# Patient Record
Sex: FEMALE | Race: WHITE | NOT HISPANIC OR LATINO | URBAN - METROPOLITAN AREA
[De-identification: names, ages, dates, MRNs, and addresses within clinical notes are randomized per-mention and may not be internally consistent; named-entity substitution may affect disease eponyms.]

---

## 2023-12-26 ENCOUNTER — HOSPITAL ENCOUNTER (OUTPATIENT)
Dept: CARDIOLOGY | Facility: CLINIC | Age: 73
Discharge: HOME | End: 2023-12-26
Payer: MEDICARE

## 2023-12-26 DIAGNOSIS — I47.29 PAROXYSMAL VENTRICULAR TACHYCARDIA (MULTI): ICD-10-CM

## 2024-05-03 ENCOUNTER — HOSPITAL ENCOUNTER (OUTPATIENT)
Dept: CARDIOLOGY | Facility: CLINIC | Age: 74
Discharge: HOME | End: 2024-05-03
Payer: MEDICARE

## 2024-05-03 DIAGNOSIS — I47.29 PAROXYSMAL VENTRICULAR TACHYCARDIA (MULTI): ICD-10-CM

## 2024-05-03 PROCEDURE — 93295 DEV INTERROG REMOTE 1/2/MLT: CPT | Performed by: INTERNAL MEDICINE

## 2024-05-03 PROCEDURE — 93296 REM INTERROG EVL PM/IDS: CPT

## 2024-09-23 ENCOUNTER — HOSPITAL ENCOUNTER (OUTPATIENT)
Dept: CARDIOLOGY | Facility: CLINIC | Age: 74
Discharge: HOME | End: 2024-09-23
Payer: MEDICARE

## 2024-09-23 DIAGNOSIS — I47.29 PAROXYSMAL VENTRICULAR TACHYCARDIA (MULTI): ICD-10-CM

## 2024-09-23 PROCEDURE — 93296 REM INTERROG EVL PM/IDS: CPT

## 2024-09-23 PROCEDURE — 93295 DEV INTERROG REMOTE 1/2/MLT: CPT | Performed by: INTERNAL MEDICINE

## 2024-11-01 ENCOUNTER — HOSPITAL ENCOUNTER (OUTPATIENT)
Dept: CARDIOLOGY | Facility: CLINIC | Age: 74
Discharge: HOME | End: 2024-11-01
Payer: MEDICARE

## 2024-11-01 DIAGNOSIS — I47.29 PAROXYSMAL VENTRICULAR TACHYCARDIA (MULTI): Primary | ICD-10-CM

## 2024-11-01 DIAGNOSIS — I47.29 PAROXYSMAL VENTRICULAR TACHYCARDIA (MULTI): ICD-10-CM

## 2024-11-01 PROCEDURE — 93296 REM INTERROG EVL PM/IDS: CPT

## 2024-12-02 ENCOUNTER — APPOINTMENT (OUTPATIENT)
Dept: CARDIOLOGY | Facility: HOSPITAL | Age: 74
End: 2024-12-02
Payer: MEDICARE

## 2024-12-30 ENCOUNTER — HOSPITAL ENCOUNTER (OUTPATIENT)
Dept: CARDIOLOGY | Facility: CLINIC | Age: 74
Discharge: HOME | End: 2024-12-30
Payer: MEDICARE

## 2024-12-30 DIAGNOSIS — Z95.810 PRESENCE OF AUTOMATIC (IMPLANTABLE) CARDIAC DEFIBRILLATOR: ICD-10-CM

## 2024-12-30 DIAGNOSIS — I47.29 OTHER VENTRICULAR TACHYCARDIA: ICD-10-CM

## 2024-12-30 PROCEDURE — 93296 REM INTERROG EVL PM/IDS: CPT

## 2025-01-20 DIAGNOSIS — I42.9 CARDIOMYOPATHY, UNSPECIFIED TYPE (MULTI): ICD-10-CM

## 2025-01-20 DIAGNOSIS — Z95.810 CARDIAC DEFIBRILLATOR IN PLACE: Primary | ICD-10-CM

## 2025-01-22 ENCOUNTER — HOSPITAL ENCOUNTER (OUTPATIENT)
Dept: CARDIOLOGY | Facility: HOSPITAL | Age: 75
Discharge: HOME | End: 2025-01-22
Payer: MEDICARE

## 2025-01-22 DIAGNOSIS — I47.29 PAROXYSMAL VENTRICULAR TACHYCARDIA (MULTI): ICD-10-CM

## 2025-01-22 PROCEDURE — 93282 PRGRMG EVAL IMPLANTABLE DFB: CPT

## 2025-04-16 ENCOUNTER — HOSPITAL ENCOUNTER (OUTPATIENT)
Dept: CARDIOLOGY | Facility: CLINIC | Age: 75
Discharge: HOME | End: 2025-04-16
Payer: MEDICARE

## 2025-04-16 DIAGNOSIS — I47.29 OTHER VENTRICULAR TACHYCARDIA: ICD-10-CM

## 2025-04-16 DIAGNOSIS — Z95.810 PRESENCE OF AUTOMATIC (IMPLANTABLE) CARDIAC DEFIBRILLATOR: ICD-10-CM

## 2025-04-16 PROCEDURE — 93296 REM INTERROG EVL PM/IDS: CPT

## 2025-05-27 ENCOUNTER — HOSPITAL ENCOUNTER (OUTPATIENT)
Dept: CARDIOLOGY | Facility: CLINIC | Age: 75
Discharge: HOME | End: 2025-05-27
Payer: MEDICARE

## 2025-05-27 DIAGNOSIS — Z95.810 PRESENCE OF AUTOMATIC (IMPLANTABLE) CARDIAC DEFIBRILLATOR: Primary | ICD-10-CM

## 2025-05-27 DIAGNOSIS — I47.29 OTHER VENTRICULAR TACHYCARDIA: ICD-10-CM

## 2025-05-27 DIAGNOSIS — Z95.810 PRESENCE OF AUTOMATIC (IMPLANTABLE) CARDIAC DEFIBRILLATOR: ICD-10-CM

## 2025-05-28 ENCOUNTER — HOSPITAL ENCOUNTER (OUTPATIENT)
Dept: CARDIOLOGY | Facility: HOSPITAL | Age: 75
Discharge: HOME | End: 2025-05-28
Payer: MEDICARE

## 2025-05-28 DIAGNOSIS — I47.20 VENTRICULAR TACHYCARDIA (PAROXYSMAL): ICD-10-CM

## 2025-05-28 DIAGNOSIS — I47.29 OTHER VENTRICULAR TACHYCARDIA: ICD-10-CM

## 2025-05-28 DIAGNOSIS — Z95.810 PRESENCE OF AUTOMATIC (IMPLANTABLE) CARDIAC DEFIBRILLATOR: ICD-10-CM

## 2025-05-28 DIAGNOSIS — Z95.810 CARDIAC DEFIBRILLATOR IN PLACE: Primary | ICD-10-CM

## 2025-06-20 RX ORDER — AMIODARONE HYDROCHLORIDE 200 MG/1
200 TABLET ORAL
COMMUNITY
Start: 2025-04-04

## 2025-06-20 RX ORDER — BIMATOPROST 0.1 MG/ML
SOLUTION/ DROPS OPHTHALMIC
COMMUNITY
Start: 2021-04-19 | End: 2025-06-25 | Stop reason: WASHOUT

## 2025-06-20 RX ORDER — ONDANSETRON 4 MG/1
4 TABLET, FILM COATED ORAL
COMMUNITY
Start: 2025-04-16 | End: 2025-06-25 | Stop reason: WASHOUT

## 2025-06-20 RX ORDER — METOPROLOL SUCCINATE 50 MG/1
25 TABLET, EXTENDED RELEASE ORAL
COMMUNITY
Start: 2025-04-04

## 2025-06-20 RX ORDER — EZETIMIBE 10 MG/1
10 TABLET ORAL
COMMUNITY
Start: 2025-04-04

## 2025-06-20 RX ORDER — MELOXICAM 15 MG/1
TABLET ORAL
COMMUNITY
Start: 2025-04-16 | End: 2025-06-25 | Stop reason: WASHOUT

## 2025-06-20 RX ORDER — METOPROLOL SUCCINATE 100 MG/1
1 TABLET, EXTENDED RELEASE ORAL DAILY
COMMUNITY
Start: 2014-10-27

## 2025-06-20 RX ORDER — LEVOTHYROXINE SODIUM 100 UG/1
100 TABLET ORAL
COMMUNITY
Start: 2025-04-04

## 2025-06-20 RX ORDER — MULTIVITAMIN WITH IRON
1 TABLET ORAL DAILY
COMMUNITY
Start: 2021-04-19 | End: 2025-06-25 | Stop reason: WASHOUT

## 2025-06-20 RX ORDER — PANTOPRAZOLE SODIUM 20 MG/1
1 TABLET, DELAYED RELEASE ORAL
COMMUNITY
Start: 2025-04-16

## 2025-06-20 RX ORDER — SACUBITRIL AND VALSARTAN 97; 103 MG/1; MG/1
1 TABLET, FILM COATED ORAL 2 TIMES DAILY
COMMUNITY
Start: 2021-05-10 | End: 2025-06-23 | Stop reason: SDUPTHER

## 2025-06-20 RX ORDER — LISINOPRIL 20 MG/1
20 TABLET ORAL
COMMUNITY
Start: 2025-04-04 | End: 2025-06-23 | Stop reason: ALTCHOICE

## 2025-06-20 RX ORDER — FUROSEMIDE 20 MG/1
20 TABLET ORAL DAILY
COMMUNITY

## 2025-06-20 RX ORDER — SPIRONOLACTONE 25 MG/1
25 TABLET ORAL DAILY
COMMUNITY
Start: 2015-06-15

## 2025-06-20 RX ORDER — NAPROXEN SODIUM 220 MG/1
TABLET, FILM COATED ORAL
COMMUNITY
Start: 2021-04-19

## 2025-06-20 RX ORDER — ERGOCALCIFEROL 1.25 MG/1
50000 CAPSULE ORAL
COMMUNITY
Start: 2025-04-04

## 2025-06-23 ENCOUNTER — APPOINTMENT (OUTPATIENT)
Dept: CARDIOLOGY | Facility: CLINIC | Age: 75
End: 2025-06-23
Payer: MEDICARE

## 2025-06-23 ENCOUNTER — PHARMACY VISIT (OUTPATIENT)
Dept: PHARMACY | Facility: CLINIC | Age: 75
End: 2025-06-23
Payer: COMMERCIAL

## 2025-06-23 VITALS
OXYGEN SATURATION: 94 % | BODY MASS INDEX: 39.39 KG/M2 | HEART RATE: 47 BPM | HEIGHT: 67 IN | WEIGHT: 251 LBS | DIASTOLIC BLOOD PRESSURE: 61 MMHG | SYSTOLIC BLOOD PRESSURE: 137 MMHG

## 2025-06-23 DIAGNOSIS — I47.20 PAROXYSMAL VENTRICULAR TACHYCARDIA: ICD-10-CM

## 2025-06-23 PROCEDURE — 99215 OFFICE O/P EST HI 40 MIN: CPT | Performed by: INTERNAL MEDICINE

## 2025-06-23 PROCEDURE — RXMED WILLOW AMBULATORY MEDICATION CHARGE

## 2025-06-23 PROCEDURE — 1036F TOBACCO NON-USER: CPT | Performed by: INTERNAL MEDICINE

## 2025-06-23 PROCEDURE — 1159F MED LIST DOCD IN RCRD: CPT | Performed by: INTERNAL MEDICINE

## 2025-06-23 RX ORDER — SACUBITRIL AND VALSARTAN 97; 103 MG/1; MG/1
1 TABLET, FILM COATED ORAL 2 TIMES DAILY
Qty: 60 TABLET | Refills: 12 | Status: SHIPPED | OUTPATIENT
Start: 2025-06-23 | End: 2025-06-25

## 2025-06-23 NOTE — PROGRESS NOTES
Primary Care Physician: Mio Otero DO    Date of Visit: 06/23/2025  2:40 PM EDT  Location of visit:  [facility]   Type of Visit: Follow up        DIAGNOSES: SOB NYHA Class II. VT s/p ICD implant (3/9/2016) Abdominal ICD 1995- Change out 5/2015 -[employer]) -Appropriate ICD discharge for VT -5/1/2021. Preserved LV systolic function -Impaired relaxation -Chronic diastolic heart failure (MUGA 10/5/2018 -EF 50-55%). Euvolemic status. Swelling of feet. Negative Lexiscan MIBI stress test (3/9/2016) HTN. DLP. Obesity. SHEBA.      Chief Complaint   Patient presents with    Valve Disorder     Here for follow up, no cardiac concerns today        HPI / Summary:   Anais Hill is a 74 y.o. female  who returns for routine follow up.  She feels well and denies any cardiac symptoms.  She had had ICD discharges in the past.  She denies any chest pain, shortness of breath, palpitations, dizziness or syncope.      12 system review is negative except as noted above        Medical History:   Medical History[1]    Social History:   Tobacco Use: Low Risk  (6/23/2025)    Patient History     Smoking Tobacco Use: Never     Smokeless Tobacco Use: Never     Passive Exposure: Not on file         MEDICATIONS:   Current Outpatient Medications   Medication Instructions    amiodarone (PACERONE) 200 mg, Daily RT    aspirin 81 mg chewable tablet Daily RT    bimatoprost (Lumigan) 0.01 % ophthalmic solution Daily RT    ergocalciferol (Vitamin D-2) 1250 mcg (50,000 units) capsule 50,000 Int'l Units    ezetimibe (ZETIA) 10 mg    furosemide (LASIX) 20 mg, Daily    levothyroxine (SYNTHROID, LEVOXYL) 100 mcg, Daily RT    meloxicam (Mobic) 15 mg tablet 1 tabs oral daily,x30 days    metoprolol succinate XL (Toprol-XL) 100 mg 24 hr tablet 1 tablet, Daily    metoprolol succinate XL (TOPROL-XL) 25 mg, Daily RT    multivitamin (Multiple Vitamins) tablet 1 tablet, Daily    ondansetron (ZOFRAN) 4 mg    pantoprazole (ProtoNix) 20 mg EC tablet 1 tablet, Daily  "(3830)    sacubitriL-valsartan (Entresto)  mg tablet 1 tablet, 2 times daily    spironolactone (ALDACTONE) 25 mg, Daily       LABS:    CBC with Differential:    Lab Results   Component Value Date    WBC 8.2 05/18/2021    RBC 5.09 05/18/2021    HGB 15.3 05/18/2021    HCT 47.7 (H) 05/18/2021     05/18/2021    MCV 94 05/18/2021    MCHC 32.1 05/18/2021    RDW 13.3 05/18/2021    LYMPHOPCT 26.9 05/18/2021    MONOPCT 9.2 05/18/2021    EOSPCT 2.1 05/18/2021    BASOPCT 0.5 05/18/2021    MONOSABS 0.76 05/18/2021    LYMPHSABS 2.21 05/18/2021    EOSABS 0.17 05/18/2021    BASOSABS 0.04 05/18/2021     CMP:    Lab Results   Component Value Date     05/18/2021    K 4.2 05/18/2021     05/18/2021    CO2 26 05/18/2021    BUN 19 05/18/2021    CREATININE 1.10 (H) 05/18/2021    GLUCOSE 135 (H) 05/18/2021    PROT 7.2 05/18/2021    CALCIUM 9.3 05/18/2021    BILITOT 0.7 05/18/2021    ALKPHOS 56 05/18/2021    AST 19 05/18/2021    ALT 22 05/18/2021     BMP:    Lab Results   Component Value Date     05/18/2021    K 4.2 05/18/2021     05/18/2021    CO2 26 05/18/2021    BUN 19 05/18/2021    CREATININE 1.10 (H) 05/18/2021    CALCIUM 9.3 05/18/2021    GLUCOSE 135 (H) 05/18/2021     Magnesium:  Lab Results   Component Value Date    MG 1.96 05/18/2021         Lipid Panel:  Lab Results   Component Value Date    HDL 34.9 (A) 05/18/2021    CHHDL 8.2 (A) 05/18/2021    VLDL 40 05/18/2021    TRIG 201 (H) 05/18/2021    NHDL 251 05/18/2021        Lab work and imaging results independently reviewed by me     Visit Vitals  /61   Pulse (!) 47   Ht 1.702 m (5' 7\")   Wt 114 kg (251 lb)   SpO2 94%   BMI 39.31 kg/m²   Smoking Status Never   BSA 2.32 m²           Physical Exam  On examination, she was comfortably sitting.  HEENT: normal, Neck: supple, Carotids: brisk upstroke, JVP: normal, CVS: Rate and rhythm regular, S1S2, Chest: Left prepectoral device present.  CTAB, Abdomen: soft nontender, no organomegaly " appreciated, Extremities: without any edema, CNS: HMF normal, no focal neurological deficit.      DIAGNOSES: SOB NYHA Class II. VT s/p ICD implant (3/9/2016) Abdominal ICD 1995- Change out 5/2015 -[employer]) -Appropriate ICD discharge for VT -5/1/2021. Preserved LV systolic function -Impaired relaxation -Chronic diastolic heart failure (MUGA 10/5/2018 -EF 50-55%). Euvolemic status. Swelling of feet. Negative Lexiscan MIBI stress test (3/9/2016) HTN. DLP. Obesity. SHEBA.     I am pleased to see that Anais has been remaining stable from a cardiac perspective, in euvolemic state.  I have changed the lisinopril to sacubitril/valsartan 97/103 mg twice daily.  I advised her to continue with medications as prescribed and regular follow-up with you.    Thank you so much for the opportunity to participate in the care of this very pleasant lady. Please do not hesitate to contact me if I could be of any assistance in her future care.    Sincerely        Regina Brito M.D.            06/23/25 at 3:15 PM - Kaleb Brito MD      Orders:  No orders of the defined types were placed in this encounter.        Followup Appts:  No future appointments.        [1]   Past Medical History:  Diagnosis Date    Personal history of sudden cardiac arrest     History of cardiac arrest

## 2025-06-25 ENCOUNTER — TELEPHONE (OUTPATIENT)
Dept: CARDIOLOGY | Facility: CLINIC | Age: 75
End: 2025-06-25
Payer: MEDICARE

## 2025-06-25 DIAGNOSIS — I47.20 PAROXYSMAL VENTRICULAR TACHYCARDIA: ICD-10-CM

## 2025-06-25 RX ORDER — SACUBITRIL AND VALSARTAN 97; 103 MG/1; MG/1
0.5 TABLET, FILM COATED ORAL 2 TIMES DAILY
Qty: 60 TABLET | Refills: 12 | Status: SHIPPED | OUTPATIENT
Start: 2025-06-25

## 2025-06-25 NOTE — TELEPHONE ENCOUNTER
Let Anais Know she can cut her Entresto in half and try to stay inside while it's hot and stay hydrated. Per Dr. Brito

## 2025-07-22 ENCOUNTER — HOSPITAL ENCOUNTER (OUTPATIENT)
Dept: CARDIOLOGY | Facility: CLINIC | Age: 75
Discharge: HOME | End: 2025-07-22
Payer: MEDICARE

## 2025-07-22 DIAGNOSIS — Z95.810 PRESENCE OF AUTOMATIC (IMPLANTABLE) CARDIAC DEFIBRILLATOR: ICD-10-CM

## 2025-07-22 DIAGNOSIS — I47.29 OTHER VENTRICULAR TACHYCARDIA: ICD-10-CM

## 2025-07-22 PROCEDURE — 93296 REM INTERROG EVL PM/IDS: CPT

## 2025-08-27 ENCOUNTER — HOSPITAL ENCOUNTER (OUTPATIENT)
Dept: CARDIOLOGY | Facility: CLINIC | Age: 75
Discharge: HOME | End: 2025-08-27
Payer: MEDICARE

## 2025-08-27 DIAGNOSIS — I47.20 PAROXYSMAL VENTRICULAR TACHYCARDIA: ICD-10-CM
